# Patient Record
Sex: FEMALE | Race: WHITE | NOT HISPANIC OR LATINO | Employment: STUDENT | URBAN - METROPOLITAN AREA
[De-identification: names, ages, dates, MRNs, and addresses within clinical notes are randomized per-mention and may not be internally consistent; named-entity substitution may affect disease eponyms.]

---
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
## 2025-01-10 ENCOUNTER — PATIENT MESSAGE (OUTPATIENT)
Dept: ADMINISTRATIVE | Facility: OTHER | Age: 20
End: 2025-01-10
Payer: MEDICAID

## 2025-01-10 ENCOUNTER — PATIENT MESSAGE (OUTPATIENT)
Dept: RHEUMATOLOGY | Facility: CLINIC | Age: 20
End: 2025-01-10
Payer: MEDICAID

## 2025-03-12 DIAGNOSIS — Z79.620 ADALIMUMAB (HUMIRA) LONG-TERM USE: ICD-10-CM

## 2025-03-12 DIAGNOSIS — M05.9 RHEUMATOID ARTHRITIS WITH POSITIVE RHEUMATOID FACTOR, INVOLVING UNSPECIFIED SITE: ICD-10-CM

## 2025-03-12 RX ORDER — ADALIMUMAB 40MG/0.4ML
40 KIT SUBCUTANEOUS
Qty: 2 EACH | Refills: 0 | Status: ACTIVE | OUTPATIENT
Start: 2025-03-12 | End: 2026-03-12

## 2025-03-13 ENCOUNTER — TELEPHONE (OUTPATIENT)
Dept: RHEUMATOLOGY | Facility: CLINIC | Age: 20
End: 2025-03-13
Payer: MEDICAID

## 2025-03-13 NOTE — TELEPHONE ENCOUNTER
----- Message from Mariana Elkins MD sent at 3/12/2025  6:08 PM CDT -----  Regarding: Adult pt  Last seen April 2024, supposed to return in 6 months and at that point transerred to adult clinic. She is now 20! Refilled Humira x 1 month only, will not send any more without a visit/labs and after that point she must go to an adult provider.

## 2025-04-02 ENCOUNTER — TELEPHONE (OUTPATIENT)
Dept: RHEUMATOLOGY | Facility: CLINIC | Age: 20
End: 2025-04-02
Payer: MEDICAID

## 2025-04-02 NOTE — TELEPHONE ENCOUNTER
----- Message from Bonifacio sent at 4/2/2025  1:04 PM CDT -----  Contact: mom @ [phone]  Name of Who is Calling: mom  What is the request in detail: calling to change appt time from 11a to 9a  Can the clinic reply by [facility]: no  What [facility]: [phone]

## 2025-04-02 NOTE — TELEPHONE ENCOUNTER
Called and spoke to mom. Informed that unfortunately we did not have any sooner appointment available for the same day. Mom verbalized an understanding.    no distress/obese/well-developed/well-groomed

## 2025-04-09 ENCOUNTER — OFFICE VISIT (OUTPATIENT)
Dept: RHEUMATOLOGY | Facility: CLINIC | Age: 20
End: 2025-04-09
Payer: MEDICAID

## 2025-04-09 ENCOUNTER — LAB VISIT (OUTPATIENT)
Dept: LAB | Facility: HOSPITAL | Age: 20
End: 2025-04-09
Payer: MEDICAID

## 2025-04-09 VITALS
TEMPERATURE: 98 F | BODY MASS INDEX: 24.1 KG/M2 | DIASTOLIC BLOOD PRESSURE: 59 MMHG | WEIGHT: 141.19 LBS | HEART RATE: 64 BPM | SYSTOLIC BLOOD PRESSURE: 111 MMHG | HEIGHT: 64 IN | RESPIRATION RATE: 16 BRPM

## 2025-04-09 DIAGNOSIS — Z79.620 ADALIMUMAB (HUMIRA) LONG-TERM USE: ICD-10-CM

## 2025-04-09 DIAGNOSIS — M05.9 RHEUMATOID ARTHRITIS WITH POSITIVE RHEUMATOID FACTOR, INVOLVING UNSPECIFIED SITE: ICD-10-CM

## 2025-04-09 DIAGNOSIS — M54.50 ACUTE BILATERAL LOW BACK PAIN WITHOUT SCIATICA: ICD-10-CM

## 2025-04-09 DIAGNOSIS — M05.9 RHEUMATOID ARTHRITIS WITH POSITIVE RHEUMATOID FACTOR, INVOLVING UNSPECIFIED SITE: Primary | ICD-10-CM

## 2025-04-09 DIAGNOSIS — Z79.631 LONG TERM METHOTREXATE USER: ICD-10-CM

## 2025-04-09 DIAGNOSIS — E55.9 VITAMIN D INSUFFICIENCY: ICD-10-CM

## 2025-04-09 DIAGNOSIS — R14.0 BLOATING: ICD-10-CM

## 2025-04-09 LAB
25(OH)D3+25(OH)D2 SERPL-MCNC: 42 NG/ML (ref 30–96)
ABSOLUTE EOSINOPHIL (OHS): 0.15 K/UL
ABSOLUTE MONOCYTE (OHS): 0.73 K/UL (ref 0.3–1)
ABSOLUTE NEUTROPHIL COUNT (OHS): 7.08 K/UL (ref 1.8–7.7)
ALBUMIN SERPL BCP-MCNC: 4.2 G/DL (ref 3.5–5.2)
ALP SERPL-CCNC: 63 UNIT/L (ref 40–150)
ALT SERPL W/O P-5'-P-CCNC: 20 UNIT/L (ref 10–44)
ANION GAP (OHS): 12 MMOL/L (ref 8–16)
AST SERPL-CCNC: 25 UNIT/L (ref 11–45)
BASOPHILS # BLD AUTO: 0.05 K/UL
BASOPHILS NFR BLD AUTO: 0.5 %
BILIRUB SERPL-MCNC: 0.3 MG/DL (ref 0.1–1)
BUN SERPL-MCNC: 24 MG/DL (ref 6–20)
CALCIUM SERPL-MCNC: 9.4 MG/DL (ref 8.7–10.5)
CHLORIDE SERPL-SCNC: 108 MMOL/L (ref 95–110)
CO2 SERPL-SCNC: 20 MMOL/L (ref 23–29)
CREAT SERPL-MCNC: 0.8 MG/DL (ref 0.5–1.4)
CRP SERPL-MCNC: <0.3 MG/L
ERYTHROCYTE [DISTWIDTH] IN BLOOD BY AUTOMATED COUNT: 12.5 % (ref 11.5–14.5)
ERYTHROCYTE [SEDIMENTATION RATE] IN BLOOD BY PHOTOMETRIC METHOD: 3 MM/HR
GFR SERPLBLD CREATININE-BSD FMLA CKD-EPI: >60 ML/MIN/1.73/M2
GLUCOSE SERPL-MCNC: 77 MG/DL (ref 70–110)
HBV CORE AB SERPL QL IA: NORMAL
HBV SURFACE AG SERPL QL IA: NORMAL
HCT VFR BLD AUTO: 42.2 % (ref 37–48.5)
HGB BLD-MCNC: 13.6 GM/DL (ref 12–16)
IMM GRANULOCYTES # BLD AUTO: 0.03 K/UL (ref 0–0.04)
IMM GRANULOCYTES NFR BLD AUTO: 0.3 % (ref 0–0.5)
LYMPHOCYTES # BLD AUTO: 1.76 K/UL (ref 1–4.8)
MCH RBC QN AUTO: 30.5 PG (ref 27–31)
MCHC RBC AUTO-ENTMCNC: 32.2 G/DL (ref 32–36)
MCV RBC AUTO: 95 FL (ref 82–98)
NUCLEATED RBC (/100WBC) (OHS): 0 /100 WBC
PLATELET # BLD AUTO: 270 K/UL (ref 150–450)
PMV BLD AUTO: 9.6 FL (ref 9.2–12.9)
POTASSIUM SERPL-SCNC: 4.2 MMOL/L (ref 3.5–5.1)
PROT SERPL-MCNC: 7.3 GM/DL (ref 6–8.4)
RBC # BLD AUTO: 4.46 M/UL (ref 4–5.4)
RELATIVE EOSINOPHIL (OHS): 1.5 %
RELATIVE LYMPHOCYTE (OHS): 18 % (ref 18–48)
RELATIVE MONOCYTE (OHS): 7.4 % (ref 4–15)
RELATIVE NEUTROPHIL (OHS): 72.3 % (ref 38–73)
SODIUM SERPL-SCNC: 140 MMOL/L (ref 136–145)
WBC # BLD AUTO: 9.8 K/UL (ref 3.9–12.7)

## 2025-04-09 PROCEDURE — 86140 C-REACTIVE PROTEIN: CPT

## 2025-04-09 PROCEDURE — 86480 TB TEST CELL IMMUN MEASURE: CPT

## 2025-04-09 PROCEDURE — 99215 OFFICE O/P EST HI 40 MIN: CPT | Mod: S$PBB,,, | Performed by: PEDIATRICS

## 2025-04-09 PROCEDURE — 82306 VITAMIN D 25 HYDROXY: CPT

## 2025-04-09 PROCEDURE — 87340 HEPATITIS B SURFACE AG IA: CPT

## 2025-04-09 PROCEDURE — 3008F BODY MASS INDEX DOCD: CPT | Mod: CPTII,,, | Performed by: PEDIATRICS

## 2025-04-09 PROCEDURE — 99215 OFFICE O/P EST HI 40 MIN: CPT | Mod: PBBFAC | Performed by: PEDIATRICS

## 2025-04-09 PROCEDURE — 85025 COMPLETE CBC W/AUTO DIFF WBC: CPT

## 2025-04-09 PROCEDURE — 1159F MED LIST DOCD IN RCRD: CPT | Mod: CPTII,,, | Performed by: PEDIATRICS

## 2025-04-09 PROCEDURE — 85652 RBC SED RATE AUTOMATED: CPT

## 2025-04-09 PROCEDURE — 86704 HEP B CORE ANTIBODY TOTAL: CPT

## 2025-04-09 PROCEDURE — 1160F RVW MEDS BY RX/DR IN RCRD: CPT | Mod: CPTII,,, | Performed by: PEDIATRICS

## 2025-04-09 PROCEDURE — 3074F SYST BP LT 130 MM HG: CPT | Mod: CPTII,,, | Performed by: PEDIATRICS

## 2025-04-09 PROCEDURE — 99999 PR PBB SHADOW E&M-EST. PATIENT-LVL V: CPT | Mod: PBBFAC,,, | Performed by: PEDIATRICS

## 2025-04-09 PROCEDURE — 3078F DIAST BP <80 MM HG: CPT | Mod: CPTII,,, | Performed by: PEDIATRICS

## 2025-04-09 PROCEDURE — 80053 COMPREHEN METABOLIC PANEL: CPT

## 2025-04-09 PROCEDURE — 36415 COLL VENOUS BLD VENIPUNCTURE: CPT

## 2025-04-09 RX ORDER — DICLOFENAC SODIUM 10 MG/G
2 GEL TOPICAL 4 TIMES DAILY
Qty: 100 G | Refills: 2 | Status: SHIPPED | OUTPATIENT
Start: 2025-04-09

## 2025-04-09 RX ORDER — ADALIMUMAB 40MG/0.4ML
40 KIT SUBCUTANEOUS
Qty: 2 EACH | Refills: 11 | Status: ACTIVE | OUTPATIENT
Start: 2025-04-09 | End: 2026-04-09

## 2025-04-09 RX ORDER — ADALIMUMAB 40MG/0.4ML
40 KIT SUBCUTANEOUS
Qty: 2 EACH | Refills: 11 | Status: SHIPPED | OUTPATIENT
Start: 2025-04-09 | End: 2025-04-09 | Stop reason: SDUPTHER

## 2025-04-09 NOTE — PROGRESS NOTES
[facility] - RETURN VISIT     NAME: Kirstin Vega  : 2005  MR#: [id number]     DATE of VISIT: 2025  Date of last visits: 08/10/2023 and 2024  Date of initial visit: 3/15/2022     Reason for visit: follow up MAE     HPI:  Kirstin Vega is a 20 y.o. 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 She was last seen a year ago.  PCP is Ariadna Cano DO.      History is obtained from the patient with some input from mother     CC: follow-up  RA     RHEUM INTERIM HX 2024 - 2025  General:  Overall has done well from the standpoint of her RA; primary issues today are back pain and GI symptoms.  Currrently is is softball season so is very physically active and c/o a lot of muscular lower back pain. Is lifting a lot of weights. She does see the  and does workouts specifically for her back, gets treatments - heat, has not done stimulation yet for her back but does get it for her shoulder. Takes ice baths. No numbness, no radiation. Points to paraspinal muscles.  Meds: Off methotrexate, continues on Humira 40 mg QOW, OTC Vit D,  No NSAIDs.   Joints: No pain, no swelling. No issues with typing, writing, or playing softball. With small joints.  Only back and shoulder  pain from softball.   GI: Has a lot of bloating, has some constipation, not really any diarrhea.  Has always had this to some degree but is more of an issue as it interferes with playing softball. Lying down helps it. Uses [facility].  Has not discussed with PCP. Off gastric protection; did not notice any difference in GI discomfort when this was stopped. No reflux symptoms. Does not eat a lot of dairy but does think that a lot of yogurt will cause bloating. She feels as though certain oils are an issue.  New issues: just lower back  pain  Skin:  No rashes.   Ophtho: Last seen by Dr Pollock in October. no eye problems. Follow up is once a year at this point.   Infections/Antibiotics: none  Social/Grade/PE/Sports: In college at [employer]. Playing softball, shortstop.     DENIES:         Alopecia         Chest pain         Discoloration of fingers/Raynaud's phenomena.          Fatigue         Fevers         Headaches         Malar rash         Muscle weakness          Myalgias         Oral sores -previously when started on methrotrexate, has not had since then.          Photosensitivity          Weakness    ROS:   Pertinent symptoms in HPI; remainder non contributory or negative.      PMHx NARRATIVE  Rheumatology   Hx at initial visit 03/15/2022:   End of Dec started with pain in her fingers, was playing softball, though maybe jammed finger, started L 2nd finger, started swelling fairly soon after. R hand started early Feb with finger pain and swelling, toes started about the same time. Started Meloxicam about a month ago, has helped some. Has helped with the pain but not much with the swelling. Missed a dose, had pain the next day. Stiffness > 1 hour. Can do ADLs,   able to play softball. Also lifts weights daily - squats 215, bench 120. Sometimes thumbs hurt.  Associated symptoms: (fever/rash/wt change/GI symptoms): no. Energy is good as is sleep.  Injuries/trauma: ankle fracture R foot, 3 years ago; R wrist fell and broke in 2 places age 9.  Vision/ocular complaints: Denies redness, pain, vision changes.  Denied others in Rheum ROS  LABS ->  MALOU negative, RF/CCP+, HLA B27 (-) with nl ESR, CRP, CBC. COVID Abs (-), Quant Gold (-).  L hand film Feb 2022 -> nl  PE on 3/15/2022: active synovitis in fingers and toes. B thumb MCPs tender, B 2nd PIPs S>T, B MTP squeeze painful without toe swelling.  Started on MTX 12.5 mg po once a week and given a steroid taper.  ~~~ MAR - APR 2022: Improved slightly on MTX 12.5 mg po weekly and Meloxicam but still  symptomatic; MTX increased and prednisone taper continued/restarted   ~~~ APR - MAY 2022: Finished Pulsed steroids 3 days ago, since finishing feels like swelling and pain in her fingers is slowly getting worse. Feet have been better, Right foot has been hurting due to volleyball injury. Taking MTX 15 mg po weekly (Monday nights), Folic acid, Vit D, PPI, methylphenidate, Mobic (taking almost every day). Pain B second digit MIP,. B third digit MIP and DIP. 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 No uveitis, no problems with vision, no eye redness or pain.  PE -> B thumb MCPs, B 2nd PIPs S>T, B MTP squeeze. Humira prescribed  ~~~ MAY - JUL 2022: started Humira (initial dose 05/14/2022) and has subsequently has had improved in joint pain to essentially no pain except with flexion of index finger, no pain to deep palpation. Swelling has improved but synovitis in multiple fingers persists. Ophtho -> Had idiopathic lattice degeneration R eye,ncompleted laser Surgery in June. Trace swelling of PIPs but no active synovitis. Humira and MTX were continued.  ~~~ JUL -SEP 2022  Doing much better! Pain and swelling in fingers nearly gone, no pain in feet. On Humira 40 mg QOW,  MTX 15 mg po weekly (6 tabs), Folic acid, Vit D, PPI, methylphenidate. She has not needed to take any NSAIDs. Swelling nearly gone other than 2nd PIPs, only pain is with full flexion of R 2nd PIP. No stiffness currently. No new joints. No other issues.  Works out everyday, plays volleyball. In 12th grade in high school at [employer]. Stressful schedule. PE -> no active synovitis; No pain or swelling in MCPs, B 2nd PIPs nontender, full flexion, trace swelling;  strength 5/5; feet wnl. Humira and MTX continued as well as folic acid. Toxicity labs normal, normal CRP and ESR.   ~~~ SEP 2022 - JAN 2023  Humira 40 mg QOW,  MTX 15 mg po  weekly (6 tabs), Folic acid, Vit D, PPI, methylphenidate. She has not needed to take any NSAIDs   Jt pain is controlled currently. 0/10 Pain. Previously had swelling in finger joints (particularly left pointer finger - couldn't bend finger) which has since resolved. No ulcers/rashes recently (went away when started taking folic acid). No eye problems, chest pain, respiratory complaints, const/diarrhea   Last Ophtho visit summer 2022, Dr Jones, no eye problems. Works out everyday, plays volleyball and softball. In 12th grade in high school at [employer]. Stressful schedule. Deciding which college would like to attend - would like to pursue civil engineering  PE -> normal; no synovitis. Labs normal. Plan: Start decreasing the methotrexate by one tablet (decrease to 5 or 12.5 mg) but continue the Humira.  RV after school gets out early summer   ~~~ JAN - AUG 2023  Doing well, about to start college at [employer]  Meds: Humira 40 mg QOW,  MTX 12. 5 mg po weekly (6 tabs), OTC Vit D, folic acid; PPI. No NSAIDs. No joint pain, swelling, stiffness. Feels like Humira is really helping.  New issues: Has had a dry cough since late May, worse when lying down. Has feeling of ears being blocked, is sniffling in clinic. Had [place] in July with lymphadenopathy, low grade fever.   Ophtho: Last seen recently -  summer 2023, Dr Jones, no eye problems  No GI issues or rashes. Mono as above. No Covid vax  PE -> Normal, no synovitis. Continue Humira 40mg QOW. Wean MTX -> 5 tabs po weekly -> 4 tabs (10 mg) x 4 weeks then 3 tabs (7.5 mg) po weekly until return.   Continue folic acid. Stop PPI     Infectious Agents/Pathogens:    Hx at initial visit 03/15/2022:  COVID (infection, exposure, vaccination): Never had a positive test, mid December 2021 may have had it. [COVID Abs negative March 2022]  Hx of Strep: maybe once  Respiratory: Hx of frequent ear infections? no.  Hx of sinus infections? no.  Hx of pneumonias? No.  GI: Hx  of significant GI infections? no.   Skin: Hx of staph infections or thrush? Impetigo once  Viral: Warts and molluscum have not been a problem.   No history of severe, prolonged, frequent or unusual infections.  July 2023 + [place]      GI:   Hx at initial visit 03/15/2022:  Frequent abdominal pain, sensitive to some foods - too much fiber; denies dsyphagia, GERD, diarrhea, constipation, blood in stool.    Past Medical History:   Diagnosis Date    ADHD (attention deficit hyperactivity disorder)     MAE (juvenile idiopathic arthritis) 2022     Past Surgical History:   Procedure Laterality Date    RETINAL LASER PROCEDURE  06/27/2022     Allergies as of 04/09/2025    (No Active Allergies)     RHEUM FAMILY HX:    MGM with Hashimoto's, osteoarthritis     There is no (other) known family history of JRA/MAE, RA, Psoriasis, SLE, Sjogren's, Dermatomyositis, Scleroderma, Thyroiditis (Hashimotos or Graves), Raynaud's, Crohns/UC/inflammatory bowel disease, vitiligo, autoimmune cytopenias, recurrent miscarriages, Acute Rheumatic Fever, immune deficiency, or unusual infections.      SOCIAL HX:  Lives with   Mom, Dad, younger sister (14 at dx)    Graduated [employer]   spring 2023, attending [employer] fall 2023      Sports/PE:   Softball and volleyball         Favorite Activities: gym, shopping, parties     PHYSICAL EXAM:  VS  Vitals:    04/09/25 1111   BP: (!) 111/59   Pulse: 64   Resp: 16   Temp: 97.9 °F (36.6 °C)     Wt Readings from Last 1 Encounters:   04/09/25 64 kg (141 lb 3.3 oz)     04/03/24 67.4 kg (148 lb 11.2 oz)   Body surface area is 1.7 meters squared.  Body mass index is 24.24 kg/m².    Pediatric-Oriented Exam:  VITAL SIGNS: reviewed.   NUTRITIONAL STATUS: Growth charts reviewed    GENERAL APPEARANCE: well nourished, alert, active, NAD. Appears well  SKIN: no skin lesions, moist, warm.   HEAD: normocephalic, no alopecia.   EYES: EOMI, conjunctivae clear, no infraorbital shiners.   EARS: TM's normal  bilaterally, no fluid visible.   NOSE: no nasal flaring, mucosa erythematous with moderate turbinates, no drainage   ORAL CAVITY: moist mucus membranes, teeth in good repair, no lesions or ulcers, scant cobblestoning of posterior pharynx.  LYMPH: no significant lymphadenopathy .   NECK: supple, thyroid normal.   CHEST: normal contour, no tenderness.   LUNGS: auscultation clear bilaterally, breath sounds normal.  HEART: RSR, no murmur, no rub.   ABDOMEN: soft, nontender, no HSM.   MS/BACK: see Rheum.  DIGITS: no cyanosis, edema, clubbing.   NEURO: non-focal .   PSYCH: normal mood and affect for age.   EXTREMITIES: tone and power are equal and symmetrical.      Rheumatology  CERVICAL SPINES: normal flexion, rotations and extension   LUMBAR SPINES: normal forward and lateral bending. Points to paraspinal area at base of lumbar spine. No point tenderness or pain on ROM  UPPER EXTREMITY: no synovitis.   LOWER EXTREMITY: no synovitis, leg lengths equal; gait normal; able to  touch toes with knees straight.  SHOULDERS: normal range of motion, no pain.   ELBOWS: normal range of motion, no synovitis, no pain.   WRISTS: normal range of motion, no synovitis, Pain on R ulnar aspect w/ supination.   HANDS: No pain or swelling in MCPs, PIPs, DIPs;  strength 5/5   HIPS: normal range of motion, no pain.   KNEES: normal alignment and range of motion, no swelling or warmth, no pain on palpation and no enthesitis pain.   ANKLES: normal range of motion, no synovitis, no pain on palpation, no enthesitis pain.   FEET:no tenderness on B MTP squeeze, no toe swelling, no enthesitis pain  THORACIC SPINE: normal without tenderness, normal ROM.   SACROILIAC: no tenderness.   FIBROMYALGIA TENDER POINTS: none present.        OUTSIDE RECORD REVIEW:  NOTES:  02/15/2022 ([facility])  Chief Complaint: Finger Pain  Patient presents clinic today for evaluation of swelling to the left 2nd digit.  She does not recall definitive  injury or trauma.  She reports the swelling has been there for approximately 4 weeks.  She is able to do her normal daily activities however she does have some pain when she wears her baseball glove on her left hand.  She presents for further evaluation  Update 2/15/22:  Patient returns for follow-up.  She reports increased swelling in the left 2nd digit and a new swelling that began in the right 2nd digit.  She denies any definitive injury or trauma.  She continues to participate in her softball activities.  She also reports a new onset of pain in the balls of both feet that began approximately 2-3 weeks ago.  She has been taking ibuprofen 400 mg as needed for pain.  There is no pertinent family history of any rheumatological conditions per g on.  She presents for re-evaluation today  PE -> Moderate soft tissue swelling and bruising is noted throughout the left 2nd digit  There is tenderness palpation of the left 2nd proximal interphalangeal joint  New swelling is noted to right index finger  Patient exhibits good flexion extension of the left 2nd PIP joint with some limitation due to swelling  There is no instability or ligamentous laxity of the left 2nd digit  Good sensation to light touch  Brisk capillary refill in all the digits.  Ibuprofen, ref to Rheum.  Reviewed [facility] notes 2022 (Dr Valente, Peds Rheum)  Essentially the same as above; labs ordered as below, Rx Mobic.     IMAGIN2022 ([facility])  XR FINGER 2 OR MORE VIEWS LEFT  FINDINGS: No fracture or dislocation.  The joint spaces are maintained.  No erosions.  No soft tissue abnormality. Unremarkable radiographs of the left index finger.     LABS:   02/15/2022 (Mary Scott)  MALOU (-)  RF 24  ESR < 2, CRP < 3 mg/L  CBC unremarkable  Uric acid normal     ([place])  2022  HLA B27 (-)  RF 34, CCP > 300  Nl CBC  LUPUS ANTICOAGULANT /Cardiolipin Abs: negative  Vit D 29     LABS INITIAL VISIT [facility] 03/15/2022  ESR 4  Nl  CMP  COVID IgG negative   03/15/2022             Immunoglobulins (IgG, IgA, IgM) Quantitative     Collection Time: 03/15/22 11:45 AM   Result Value Ref Range     IgG 932 650 - 1600 mg/dL     IgA 100 40 - 350 mg/dL     IgM 205 50 - 300 mg/dL   TISSUE TRANSGLUTAMINASE, IGA     Collection Time: 03/15/22 11:45 AM   Result Value Ref Range     TTG IgA 5 <20 UNITS   Quantiferon Gold TB     Collection Time: 03/15/22 11:45 AM   Result Value Ref Range     NIL 0.01538 IU/mL     TB1 - Nil 0.003 IU/mL     TB2 - Nil 0.005 IU/mL     Mitogen - Nil 6.653 IU/mL     TB Gold Plus Negative Negative       Labs unremarkable, transaminases and immunoglobulins, ESR all normal, Quant Gold negative.   No evidence of recent past COVID infection.     05/11/2022  CRP < 0.3 mg/L, ESR < 2  WBC 7.76 -> 83S 8L 6.4M 2E,  H/H 12.8/39.1, plts 284  CMP wnl     09/07/2022  CRP < 0.3 mg/L, ESR < 2; nl CBC and CMP      01/11/2023   CRP < 0.3 mg/L, ESR < 2; nl CBC and CMP; Vit D 32     08/10/2023 nl CBC and CMP     04/03/2024  CBC Auto Differential     Collection Time: 04/03/24 11:01 AM   Result Value Ref Range     WBC 7.66 3.90 - 12.70 K/uL     RBC 4.30 4.00 - 5.40 M/uL     Hemoglobin 13.5 12.0 - 16.0 g/dL     Hematocrit 40.3 37.0 - 48.5 %     MCV 94 82 - 98 fL     MCH 31.4 (H) 27.0 - 31.0 pg     MCHC 33.5 32.0 - 36.0 g/dL     RDW 12.8 11.5 - 14.5 %     Platelets 323 150 - 450 K/uL     MPV 9.9 9.2 - 12.9 fL     Immature Granulocytes 0.4 0.0 - 0.5 %     Gran # (ANC) 4.8 1.8 - 7.7 K/uL     Immature Grans (Abs) 0.03 0.00 - 0.04 K/uL     Lymph # 2.1 1.0 - 4.8 K/uL     Mono # 0.7 0.3 - 1.0 K/uL     Eos # 0.1 0.0 - 0.5 K/uL     Baso # 0.04 0.00 - 0.20 K/uL     nRBC 0 0 /100 WBC     Gran % 62.2 38.0 - 73.0 %     Lymph % 26.8 18.0 - 48.0 %     Mono % 8.7 4.0 - 15.0 %     Eosinophil % 1.4 0.0 - 8.0 %     Basophil % 0.5 0.0 - 1.9 %     Differential Method Automated     Comprehensive Metabolic Panel     Collection Time: 04/03/24 11:01 AM   Result Value Ref Range      Sodium 139 136 - 145 mmol/L     Potassium 4.3 3.5 - 5.1 mmol/L     Chloride 107 95 - 110 mmol/L     CO2 24 23 - 29 mmol/L     Glucose 78 70 - 110 mg/dL     BUN 14 6 - 20 mg/dL     Creatinine 0.8 0.5 - 1.4 mg/dL     Calcium 9.7 8.7 - 10.5 mg/dL     Total Protein 7.1 6.0 - 8.4 g/dL     Albumin 4.0 3.5 - 5.2 g/dL     Total Bilirubin 0.3 0.1 - 1.0 mg/dL     Alkaline Phosphatase 77 55 - 135 U/L     AST 15 10 - 40 U/L     ALT 13 10 - 44 U/L     eGFR >60.0 >60 mL/min/1.73 m^2     Anion Gap 8 8 - 16 mmol/L   C-Reactive Protein     Collection Time: 04/03/24 11:01 AM   Result Value Ref Range     CRP <0.3 0.0 - 8.2 mg/L   Sedimentation rate     Collection Time: 04/03/24 11:01 AM   Result Value Ref Range     Sed Rate 4 0 - 36 mm/Hr        ASSESSMENT/PLAN:   1. Rheumatoid arthritis with positive rheumatoid factor, involving unspecified site  CBC Auto Differential    Comprehensive Metabolic Panel    C-Reactive Protein    Sedimentation rate    Vitamin D    adalimumab (HUMIRA,CF,) 40 mg/0.4 mL SyKt    Ambulatory referral/consult to Rheumatology        onset as Polyarticular MAE RF positive      2. Adalimumab (Humira) long-term use  Quantiferon Gold TB    Hepatitis B Surface Antigen    Hepatitis B Core Antibody, Total    adalimumab (HUMIRA,CF,) 40 mg/0.4 mL SyKt    DISCONTINUED: adalimumab (HUMIRA,CF,) 40 mg/0.4 mL SyKt      3. Long term methotrexate user      now discontinued      4. Acute bilateral low back pain without sciatica  diclofenac sodium (VOLTAREN) 1 % Gel      5. Bloating        6. Vitamin D insufficiency          LAB RESULTS 04/09/2025     Comprehensive Metabolic Panel    Collection Time: 04/09/25 12:11 PM   Result Value Ref Range    Sodium 140 136 - 145 mmol/L    Potassium 4.2 3.5 - 5.1 mmol/L    Chloride 108 95 - 110 mmol/L    CO2 20 (L) 23 - 29 mmol/L    Glucose 77 70 - 110 mg/dL    BUN 24 (H) 6 - 20 mg/dL    Creatinine 0.8 0.5 - 1.4 mg/dL    Calcium 9.4 8.7 - 10.5 mg/dL    Protein Total 7.3 6.0 - 8.4 gm/dL     Albumin 4.2 3.5 - 5.2 g/dL    Bilirubin Total 0.3 0.1 - 1.0 mg/dL    ALP 63 40 - 150 unit/L    AST 25 11 - 45 unit/L    ALT 20 10 - 44 unit/L    Anion Gap 12 8 - 16 mmol/L    eGFR >60 >60 mL/min/1.73/m2   C-Reactive Protein    Collection Time: 04/09/25 12:11 PM   Result Value Ref Range    CRP <0.3 <=8.2 mg/L   Sedimentation rate    Collection Time: 04/09/25 12:11 PM   Result Value Ref Range    Sed Rate 3 <=36 mm/hr   Vitamin D    Collection Time: 04/09/25 12:11 PM   Result Value Ref Range    Vitamin D 42 30 - 96 ng/mL   Hepatitis B Surface Antigen    Collection Time: 04/09/25 12:11 PM   Result Value Ref Range    Hep BsAg Interp Non-Reactive Non-Reactive   Hepatitis B Core Antibody, Total    Collection Time: 04/09/25 12:11 PM   Result Value Ref Range    Hep BcAb Interp Non-Reactive Non-Reactive   CBC with Differential    Collection Time: 04/09/25 12:11 PM   Result Value Ref Range    WBC 9.80 3.90 - 12.70 K/uL    RBC 4.46 4.00 - 5.40 M/uL    HGB 13.6 12.0 - 16.0 gm/dL    HCT 42.2 37.0 - 48.5 %    MCV 95 82 - 98 fL    MCH 30.5 27.0 - 31.0 pg    MCHC 32.2 32.0 - 36.0 g/dL    RDW 12.5 11.5 - 14.5 %    Platelet Count 270 150 - 450 K/uL    MPV 9.6 9.2 - 12.9 fL    Nucleated RBC 0 <=0 /100 WBC    Neut % 72.3 38 - 73 %    Lymph % 18.0 18 - 48 %    Mono % 7.4 4 - 15 %    Eos % 1.5 <=8 %    Basophil % 0.5 <=1.9 %    Imm Grans % 0.3 0.0 - 0.5 %    Neut # 7.08 1.8 - 7.7 K/uL    Lymph # 1.76 1 - 4.8 K/uL    Mono # 0.73 0.3 - 1 K/uL    Eos # 0.15 <=0.5 K/uL    Baso # 0.05 <=0.2 K/uL    Imm Grans # 0.03 0.00 - 0.04 K/uL     QUANTIFERON GOLD PENDING      Kirstin is a 20 year old with well controlled RA (previously Polyarticular MAE RF+ dx at age 17), doing extremely well on Humira 40 mg SQ q 14 days; at last visit methotrexate was discontinued a year ago. Was asked to return in August 2024 before starting sophomore year of college but did not return until now, Humira needs a Re-Authorization.  Small joints entirely normal, very  functional.   Is a serious athlete, playing competitive softball, having muscular lower back pain. Stressed working with the team , not overdoing weight lifting. May try Volteran gel to see if this would provide some relief in addition to other modalities.   Frequent complaint of bloating/gassiness, no N/V/C/D or GERD. Discussed keeping a symptom diary and in particular pay attention to dairy consumption as lactose intolerance the most frequent culprit.     Humira refills sent, continue 40 mg q 14 days. Anticipate normal result for Quant Gold.  Will start referral process to Adult Rheum as she is 20.  Otherwise RV in 6 months     RETURN: 6 months     ATTESTATION:  Parent/guardian verbalizes an understanding of the plan of care and has been educated on the purpose, side effects, and desired outcomes of any new medications given with today's visit. All questions were answered to the family's satisfaction as expressed at the close of the visit.    No Resident or Fellow participated in this encounter.  I personally reviewed and recorded the pertinent labs, tests, and other relevant data and performed the history and exam. I discussed my findings and plan with the family.      I personally reviewed the results received after the visit and provided the interpretation to the family myself or via my nurse.    Family instructed to check portal or call for results in 5-10 days.     I spent a total of 48 minutes on the day of the visit.This includes face to face time and non-face to face time preparing to see the patient (eg, review of tests), obtaining and/or reviewing separately obtained history, documenting clinical information in the electronic or other health record, independently interpreting results and communicating results to the patient/family/caregiver, or care coordinator.       Mariana Elkins MD, FAAAAI, FAAP  [facility]  [address] [place]  [address]   [phone]  Fax [phone]

## 2025-04-09 NOTE — PATIENT INSTRUCTIONS
Labs today    Refilled Humira for a year.    Keep a diary of what seems to correlate with your abdominal pain.bloating/gas. Watch dairy in particular as lactose intolerance is the most common cause.     For your back, stretching and try topical Volteran gel - can use before practice and if you have pain.

## 2025-04-10 ENCOUNTER — RESULTS FOLLOW-UP (OUTPATIENT)
Dept: ALLERGY | Facility: CLINIC | Age: 20
End: 2025-04-10
Payer: MEDICAID

## 2025-04-10 LAB
MITOGEN MINUS NIL (OHS): 9.88
NIL TB SYNCED (OHS): 0.12
QUANTIFERON GOLD INTERP (OHS): NEGATIVE
TB1 AG MINUS NIL (OHS): 0.02
TB2 AG MINUS NIL (OHS): 0.01

## 2025-04-10 NOTE — LETTER
April 9, 2025      [facility]  [address]  [facility] [address]  Phone: [phone]       Patient: Kirstin Vega   YOB: 2005  Date of Visit: 04/09/2025    To Whom It May Concern:    Liliya Vega  was at [facility] on 04/09/2025. The patient may return to work/school on 04/10/2025 with no restrictions. If you have any questions or concerns, or if I can be of further assistance, please do not hesitate to contact me.    Sincerely,    Nelida Tao MA

## 2025-07-10 ENCOUNTER — PATIENT MESSAGE (OUTPATIENT)
Dept: RHEUMATOLOGY | Facility: CLINIC | Age: 20
End: 2025-07-10
Payer: MEDICAID

## 2025-07-10 DIAGNOSIS — M54.50 ACUTE BILATERAL LOW BACK PAIN WITHOUT SCIATICA: ICD-10-CM

## 2025-07-11 ENCOUNTER — PATIENT MESSAGE (OUTPATIENT)
Dept: HEMATOLOGY/ONCOLOGY | Facility: CLINIC | Age: 20
End: 2025-07-11
Payer: MEDICAID

## 2025-07-11 ENCOUNTER — TELEPHONE (OUTPATIENT)
Dept: HEMATOLOGY/ONCOLOGY | Facility: CLINIC | Age: 20
End: 2025-07-11
Payer: MEDICAID

## 2025-07-11 NOTE — TELEPHONE ENCOUNTER
-Called patient and set up a virtual genetic appointment. ---- Message from Ashley sent at 7/11/2025 11:06 AM CDT -----  Ref for fam hx of breast cancer scanned into chart. I spoke to mom already we scheduled a younger daughter.     Fam hx of PALB2 in paternal aunt

## 2025-07-16 ENCOUNTER — PATIENT MESSAGE (OUTPATIENT)
Dept: HEMATOLOGY/ONCOLOGY | Facility: CLINIC | Age: 20
End: 2025-07-16
Payer: MEDICAID

## 2025-07-16 ENCOUNTER — TELEPHONE (OUTPATIENT)
Dept: HEMATOLOGY/ONCOLOGY | Facility: CLINIC | Age: 20
End: 2025-07-16
Payer: MEDICAID

## 2025-07-17 ENCOUNTER — PATIENT MESSAGE (OUTPATIENT)
Dept: HEMATOLOGY/ONCOLOGY | Facility: CLINIC | Age: 20
End: 2025-07-17

## 2025-07-17 ENCOUNTER — OFFICE VISIT (OUTPATIENT)
Dept: HEMATOLOGY/ONCOLOGY | Facility: CLINIC | Age: 20
End: 2025-07-17
Payer: MEDICAID

## 2025-07-17 DIAGNOSIS — Z71.83 ENCOUNTER FOR NONPROCREATIVE GENETIC COUNSELING: Primary | ICD-10-CM

## 2025-07-17 DIAGNOSIS — Z80.3 FAMILY HISTORY OF BREAST CANCER: ICD-10-CM

## 2025-07-17 DIAGNOSIS — Z84.81 FAMILY HISTORY OF GENE MUTATION: ICD-10-CM

## 2025-07-17 PROCEDURE — 98003 SYNCH AUDIO-VIDEO NEW HI 60: CPT | Mod: 95,,, | Performed by: PHYSICIAN ASSISTANT

## 2025-07-17 NOTE — PROGRESS NOTES
Hereditary/High Risk Clinic  Department of Hematology and Oncology  [facility]    Cancer Genetics  Initial Consultation    Date of Service:  25  Visit Provider:  Queta He PA-C  Collaborating Physician:  Hetal Pan MD    Patient:  Kirstin Vega  :  2005  MRN:   [id number]     Referring Provider    Ariadna Cano, DO  [address] [place] [address]      Televisit Information:   The patient location is: [place]  The chief complaint leading to consultation is: Cancer genetic assessment    Visit type: Audiovisual    Face to Face time with patient: 60  70 minutes of total time spent on the encounter, which includes face to face time and non-face to face time preparing to see the patient (eg, review of tests), Obtaining and/or reviewing separately obtained history, Documenting clinical information in the electronic or other health record, Independently interpreting results (not separately reported) and communicating results to the patient/family/caregiver, or Care coordination (not separately reported).     Each patient to whom I provide medical services by telemedicine is:  (1) informed of the relationship between the physician and patient and the respective role of any other health care provider with respect to management of the patient; and (2) notified that he or she may decline to receive medical services by telemedicine and may withdraw from such care at any time.    IMPRESSION   Kirstin Vega was referred to genetics due to her paternal aunt having a PALB2 mutation, but Kirstin's father is the individual who needs testing first. He will be scheduled to see me to discuss testing.     SUBJECTIVE   Chief Complaint: Cancer genetic risk assessment    HPI:  Kirstin Vega is a 20 y.o. assigned female at birth who is new to the [facility] and to me.     She was referred for evaluation of genetic susceptibility to cancer  due to the following:  [] Personal history of cancer   [] Family history of cancer  [x] Family history of germline genetic mutation  [] Somatic variant concerning for being of germline origin    Focused personal history:   Cancer: No  Other tumor or pertinent mass/lesion: No  Blood disorder:  No  Pancreatitis:  No  Ashkenazi Confucianism:  No  Germline cancer-genetic testing: No    Family History  Consanguinity: No  Hereditary cancer genetic testing in blood relatives: Yes  Pertinent family history:   Paternal aunt:   Breast cancer 45, living 46  Informed DNA/Lor, unknown panel, 8/2023  PALB2 exon 5-13 deletion, P/LP (patient provided a family letter that has limited information about the testing/variant)        If this pedigree appears small/illegible on your screen, expand this note window horizontally. A copy of the pedigree is also available in Media.     Past Medical History:   Diagnosis Date    ADHD (attention deficit hyperactivity disorder)     MAE (juvenile idiopathic arthritis) 2022     Social History     Tobacco Use    Smoking status: Never    Smokeless tobacco: Never   Substance Use Topics    Alcohol use: No     Review of Systems  See HPI.    Family History   Problem Relation Name Age of Onset    Thalassemia Mother Nessa     Hypertension Father Emanuel     Hashimoto's thyroiditis Maternal Grandmother      Heart disease Paternal Grandfather Ramon         stents    PALB2 positive Paternal Aunt Kerrie      Breast cancer Paternal Aunt Kerrie  45    Breast cancer Other Roxane Vega 45    Breast cancer Other Yaritza Cortes 45    Breast cancer Other Sandy         after 50    Liver cancer Other Jayson      OBJECTIVE   Physical Exam   Limited secondary to the inherent nature of a virtual visit.  Her mother was present and participated in the visit.   Very pleasant patient.  Constitutional: No apparent distress.   Neurological: Alert and oriented.   Psychiatric: Normal mood, affect, thought content,  speech, behavior, judgment.    COUNSELING     Cancer is caused by an accumulation of genetic mutations that allow cells to grow and divide uncontrollably to form a tumor. Individuals with certain risk factors are more likely to develop genetic mutations that lead to cancer.      Age: Advancing age is the most important risk factor for cancer overall and for many individual cancer types.    Alcohol: Drinking alcohol can increase your risk of cancer of the mouth, throat, esophagus, larynx, liver, and breast.    Smoking: Tobacco contains benzene and other chemicals and can cause cancer of the lung, larynx, mouth, esophagus, throat, bladder, kidney, liver, stomach, pancreas, colon and rectum, and cervix, as well as acute myeloid leukemia.    Chemicals: Asbestos, benzene, vinyl chloride, pesticides, fertilizer, wood dust, radon, aflatoxin (a food contaminant), arsenic (a drinking water contaminant), etc.  Radiation: Ultraviolet (sun, tanning beds) and ionizing radiation (radiation therapy, xrays, CT scans).  Chronic inflammation: Chronic infections (Travis-Barr virus, HPV, hepatitis B and C, H. Pylori), abnormal immune reactions, and conditions like obesity and inflammatory bowel disease can cause DNA damage and cancer.   Hormones: Combined hormone therapy (estrogen and progestin) increases the risk for breast cancer. Hormone therapy with estrogen alone increases the risk for endometrial cancer. Diethylstilbestrol (LASHAWN) is a form of estrogen given to some pregnant women from 7116-3330 to prevent premature labor, miscarriages, and other problems. Women who took LASHAWN have an increased risk for breast cancers and their daughters have an increased risk for vaginal and cervical cancer.   Medical conditions: Fatty liver disease, cirrhosis, type 2 diabetes, metabolic syndrome (obesity, high blood pressure, high cholesterol, insuline resistance).   Immunosuppression: Due to condition (HIV/AIDS) or taking immunosuppressive  medications (after organ transplant, etc).     While most individuals have a family history of cancer, only 5-10% of cancers are hereditary, meaning they are caused by inherited genetic mutations. The remaining cancers are sporadic, meaning they are caused by mutations acquired during the individual's life as a result of aging, environmental exposures, and other causes. Some families have multiple cases of a particular cancer, but that is usually due to the cancer being very common (lung, breast, prostate), shared risk factors (environment, lifestyle), and possibly a combination of hereditary factors (for example, some families have abnormalities in their immune system or how their bodies process toxins).     Sporadic cancer (75-80%): Sporadic cancers are random occurrences caused by an accumulation of genetic mutations acquired during the individual's lifetime.   Hereditary cancer (5-10%): Hereditary cancers are caused by an inherited mutation in a single gene (usually a tumor suppressor gene) and acquired mutations. A family with a hereditary cancer syndrome will typically have individuals in every generation with the cancers caused by that gene, often diagnosed 10-20 years younger than usual, which for most cancers is before age 50.   Familial cancer (20%): Familial cancer refers to a clustering a cancer in a family that may be more than you would expect to see by chance, but they do not have an inherited mutation in a single gene that caused the cancers. Instead, their cancers are caused by a combination of shared genetic, environmental, and lifestyle factors.      GERMLINE GENETIC TESTING    Purpose:  Analyze specific genes for inherited mutations.     Results: Positive, negative, and uncertain. A positive result means a cancer-causing mutation was detected. A negative result means no cancer-causing mutations were detected. Uncertain means a genetic change was detected but it is not known if that change results  in an increased risk for cancer.     Benefits: When an individual is aware that they have a germline mutation that increases their risk for certain cancers, they can engage in the recommended screening and risk-reduction strategies.     Risks: Genetic discrimination occurs when any entity uses an individual's genetic information to make a decision that negatively impacts them. Examples would include an insurance refusing to issue a policy or an employer refusing to hire or promote someone because they have a hereditary cancer syndrome (germline mutation). The Genetic Information Nondiscrimination Act of 2008 (RUTHIE) is a federal law that protects healthcare insurance and employment at companies with 15 or more employees. This law does not protect elective insurance or employment with companies who have less than 15 employees.   Per Title I, group health plans cannot base premiums for a medical insurance plan or a group of similarly situated individuals on genetic information. RUTHIE generally prohibits plans from requesting or requiring an individual to undergo genetic tests, and prohibits a plan from collecting genetic information (including family medical history) prior to or in connection with enrollment, or for underwriting purposes.  Title 1 does not protect medical insurance obtained through the federal government or . Those entities have their own set of rules regarding genetic information.   This rule only applies to medical/health insurance. Other types of insurance (life, disability, long-term care, cancer policies, etc) are not protected. An individual can be denied coverage or charged a higher premium based on their genetic information.   Per Title II, it is illegal to discriminate against employees or applicants because of genetic information. Title II of RUTHIE prohibits the use of genetic information in making employment decisions, restricts employers and other entities covered by Title II  (employment agencies, labor organizations and joint labor-management training and apprenticeship  RUTHIE has a small business exemption for employers with less than 15 employees.     PROTECTS DOES NOT PROTECT   RUTHIE Title I  Medical insurance Elective insurance (life, cancer, disability)  Medical insurance obtained through the federal government or    RUTHIE Title II Employment at companies with 15 or more employees Employment at companies with less than 15 employees     Cost:   Cost with insurance: If testing is covered by insurance, most people pay $0-100 out of pocket for testing. The max anyone typically pays is $250, which is usually if they haven't met their deductible for the year.   Cost without insurance: The cash price for testing is $250. The cash price applies to people who do not have health insurance or have insurance, but their insurance company denied the claim due to not meeting the insurance company's clinical criteria for testing.   Most labs offer financial assistance that can lower the out of pocket amount owed for testing.     ASSESSMENT/PLAN   Comprehensive cancer risk assessment was performed today, which included an evaluation of the patient's personal and family history of cancer/tumors/polyps, estimation of the likelihood of hereditary cancer syndrome, and discussion of the potential value and risks of genetic testing.     Kirstin's paternal aunt has a PALB2 mutation. Kirstin's father is the individual who needs testing first. I will see him in clinic. If he tests positive, Kirstin has a 50% chance of having the mutation. We will discuss the timing of her testing.     1. Encounter for nonprocreative genetic counseling    2. Family history of breast cancer    3. Family history of gene mutation    Questions were encouraged and answered to the patient's satisfaction, and she verbalized understanding of information and agreement with the plan.       REFERENCES   National Comprehensive  Cancer Network (NCCN). Genetic/familial high-risk assessment: Breast, ovarian, pancreatic, and prostate. NCCN Clinical Practice Guidelines in Oncology (NCCN Guidelines), Version 3.2025.      DARIUS Kaur, PAKodakC  Physician Assistant, Hereditary & High Risk Clinic  Hematology Oncology, [facility]

## 2025-08-04 DIAGNOSIS — M25.569 KNEE PAIN, UNSPECIFIED CHRONICITY, UNSPECIFIED LATERALITY: Primary | ICD-10-CM

## 2025-08-06 NOTE — PROGRESS NOTES
"Pediatric Orthopedic Surgery Clinic Note    CC: chronic right knee pain    HPI: Patient presents with chronic right knee pain, located at tibial tubercle. Pain has been present for approximately 1 year. No single inciting injury, but she is very active in multiple sports, including softball and volleyball. During both of these sports she dives to the ground landing onto her right knee repeatedly. Pain occurs near daily, most during and after activities (especially the diving). Alleviating factors include: ice, wrapping, and PRN Motrin. Associated symptoms include: subjective weakness. No fevers, limp, knee swelling, hip pain, recent illness, mechanical symptoms, or previous dislocations. She never has left knee pain, but she does not ever dive onto left knee.    Physical Exam:  Well developed, no acute distress  Active, interactive, smiling  Unlabored work of breathing  Extremities pink and warm    Musculoskeletal:   Gait normal  Motor and sensory exam upper and lower extremities intact with normal ROM  2+ pedal pulses, brisk cap refill  Bilateral hips, feet, and ankles non-tender with normal pain-free ROM  Left knee grossly normal with normal patellar mobility    RIGHT knee exam:  No swelling, erythema, bruising, or deformity  Mild TTP over tibial tubercle  Normal ROM of knee  No pain with terminal flexion and extension of knee  Negative varus and valgus stress tests  Mild J sign  Normal patella mobility  Negative Lachman's  Negative anterior and posterior drawer  Negative straight leg raise test  Equivocal patella grind test (feels "pressure" not quite pain)  Negative medial and lateral Arianne's  Negative squat test    Imaging:  X-rays done today by my read show the following:  Normal bilateral knees    Impression:  Encounter Diagnoses   Name Primary?    Maltracking of right patella Yes    Chronic pain of right knee      Assessment/Plan:   Kirstin is in agreement with plan for conservative treatment of suspected " patellar maltracking including rest/activity modification PRN, ice PRN, Naproxen BID with meals for 2-4 weeks then PRN, and physical therapy. Order for PT placed external and printed today, as she is moving to [place], [place] next week for [facility]. Follow up in 8 weeks virtually for re-evaluation after therapy. If no improvement in pain, will consider advanced imaging vs referral to [facility] in [place], [place]. Patient did express preference to do therapy through her [facility] , but I did recommend formal physical therapy.

## 2025-08-07 ENCOUNTER — OFFICE VISIT (OUTPATIENT)
Dept: ORTHOPEDICS | Facility: CLINIC | Age: 20
End: 2025-08-07
Payer: MEDICAID

## 2025-08-07 ENCOUNTER — HOSPITAL ENCOUNTER (OUTPATIENT)
Dept: RADIOLOGY | Facility: HOSPITAL | Age: 20
Discharge: HOME | End: 2025-08-07
Attending: PEDIATRICS
Payer: MEDICAID

## 2025-08-07 DIAGNOSIS — M25.561 CHRONIC PAIN OF RIGHT KNEE: ICD-10-CM

## 2025-08-07 DIAGNOSIS — M22.8X1 MALTRACKING OF RIGHT PATELLA: Primary | ICD-10-CM

## 2025-08-07 DIAGNOSIS — M25.569 KNEE PAIN, UNSPECIFIED CHRONICITY, UNSPECIFIED LATERALITY: ICD-10-CM

## 2025-08-07 DIAGNOSIS — G89.29 CHRONIC PAIN OF RIGHT KNEE: ICD-10-CM

## 2025-08-07 PROCEDURE — 99999 PR PBB SHADOW E&M-EST. PATIENT-LVL III: CPT | Mod: PBBFAC,,, | Performed by: PEDIATRICS

## 2025-08-07 PROCEDURE — 99213 OFFICE O/P EST LOW 20 MIN: CPT | Mod: PBBFAC,25 | Performed by: PEDIATRICS

## 2025-08-07 PROCEDURE — 73562 X-RAY EXAM OF KNEE 3: CPT | Mod: 26,50,, | Performed by: RADIOLOGY

## 2025-08-07 PROCEDURE — 73562 X-RAY EXAM OF KNEE 3: CPT | Mod: TC,50

## 2025-08-07 RX ORDER — FLUTICASONE PROPIONATE 50 MCG
1 SPRAY, SUSPENSION (ML) NASAL DAILY
Qty: 16 G | Refills: 2 | Status: SHIPPED | OUTPATIENT
Start: 2025-08-07

## 2025-08-07 RX ORDER — DICLOFENAC SODIUM 10 MG/G
2 GEL TOPICAL 4 TIMES DAILY
Qty: 100 G | Refills: 2 | Status: SHIPPED | OUTPATIENT
Start: 2025-08-07

## 2025-08-07 RX ORDER — NAPROXEN 500 MG/1
500 TABLET ORAL 2 TIMES DAILY WITH MEALS
Qty: 60 TABLET | Refills: 0 | Status: SHIPPED | OUTPATIENT
Start: 2025-08-07 | End: 2025-09-06